# Patient Record
Sex: MALE | Race: WHITE | NOT HISPANIC OR LATINO | URBAN - METROPOLITAN AREA
[De-identification: names, ages, dates, MRNs, and addresses within clinical notes are randomized per-mention and may not be internally consistent; named-entity substitution may affect disease eponyms.]

---

## 2022-10-18 ENCOUNTER — OFFICE VISIT (OUTPATIENT)
Dept: URGENT CARE | Facility: CLINIC | Age: 37
End: 2022-10-18
Payer: COMMERCIAL

## 2022-10-18 VITALS
DIASTOLIC BLOOD PRESSURE: 80 MMHG | HEIGHT: 72 IN | BODY MASS INDEX: 34.29 KG/M2 | RESPIRATION RATE: 18 BRPM | WEIGHT: 253.2 LBS | OXYGEN SATURATION: 99 % | SYSTOLIC BLOOD PRESSURE: 134 MMHG | HEART RATE: 111 BPM | TEMPERATURE: 98.2 F

## 2022-10-18 DIAGNOSIS — R52 BODY ACHES: ICD-10-CM

## 2022-10-18 DIAGNOSIS — R20.2 NUMBNESS AND TINGLING IN BOTH HANDS: ICD-10-CM

## 2022-10-18 DIAGNOSIS — R20.0 NUMBNESS AND TINGLING IN BOTH HANDS: ICD-10-CM

## 2022-10-18 LAB
EXTERNAL QUALITY CONTROL: NORMAL
FLUAV+FLUBV AG SPEC QL IA: NEGATIVE
GLUCOSE BLD-MCNC: 93 MG/DL (ref 70–100)
INT CON NEG: NORMAL
INT CON NEG: NORMAL
INT CON POS: NORMAL
INT CON POS: NORMAL
SARS-COV+SARS-COV-2 AG RESP QL IA.RAPID: NEGATIVE

## 2022-10-18 PROCEDURE — 87426 SARSCOV CORONAVIRUS AG IA: CPT | Performed by: PHYSICIAN ASSISTANT

## 2022-10-18 PROCEDURE — 87804 INFLUENZA ASSAY W/OPTIC: CPT | Performed by: PHYSICIAN ASSISTANT

## 2022-10-18 PROCEDURE — 99204 OFFICE O/P NEW MOD 45 MIN: CPT | Performed by: PHYSICIAN ASSISTANT

## 2022-10-18 PROCEDURE — 82962 GLUCOSE BLOOD TEST: CPT | Performed by: PHYSICIAN ASSISTANT

## 2022-10-18 NOTE — PROGRESS NOTES
Subjective:   Cesar Isaac is a 37 y.o. male who presents for Other (Body aches , hands are clammy , bottom of hands are numb, started having symptoms this morning )        Patient presents with concerns of generalized malaise, fatigue, diffuse body aches that began around 11:00 this morning.  Initially he was experiencing clamminess in his hands and feet and a tingling sensation in his hands and feet.  Tingling has persisted and seems to improve when he lays down and worsens when he sits up.  He also feels little bit lightheaded when he moves from laying down to sitting or sitting to standing rapidly.  No syncope or presyncope.  Nothing else makes his symptoms better or worse.  He denies cough, congestion, changes in taste or smell, fevers.  He does endorse chills.  No chest pain, shortness of breath, difficulty breathing, pain with breathing.  His stomach feels a bit upset but he denies abdominal pain, nausea, vomiting.  No known exposure to COVID-19.  Symptoms began while he was at work.  He did not turn on the heater recently in his office-no concern for carbon monoxide poisoning.  No history of diabetes.  He denies chronic medical problems.  States that he has had similar tingling in his extremities with an anxiety attack previously, but anxiety did not precede his current symptoms.  He is admittedly a bit anxious now.  Denies saddle dysesthesias, loss of bowel or bladder control, lower extremity weakness.    Review of Systems   Constitutional:  Positive for chills and malaise/fatigue. Negative for diaphoresis and fever.   HENT:  Negative for congestion and sore throat.    Eyes:  Negative for blurred vision, double vision and photophobia.   Respiratory:  Negative for cough, hemoptysis, sputum production, shortness of breath and wheezing.    Cardiovascular:  Negative for chest pain and palpitations.   Gastrointestinal:  Negative for abdominal pain, blood in stool, constipation, diarrhea, melena, nausea and  vomiting.   Musculoskeletal:  Positive for myalgias (Generalized full body aches.).   Skin:  Negative for itching and rash.   Neurological:  Positive for tingling. Negative for dizziness, speech change, focal weakness, seizures, loss of consciousness, weakness and headaches (No headache prior to the onset of symptoms or currently.).     PMH:  has no past medical history on file.  MEDS: No current outpatient medications on file.  ALLERGIES: Not on File  SURGHX: No past surgical history on file.  SOCHX:  reports that he has never smoked. He has never used smokeless tobacco. He reports current alcohol use of about 2.4 oz per week. He reports that he does not use drugs.  FH: Family history was reviewed, no pertinent findings to report   Objective:   /80 (BP Location: Left arm, Patient Position: Sitting, BP Cuff Size: Large adult)   Pulse (!) 111   Temp 36.8 °C (98.2 °F) (Oral)   Resp 18   Ht 1.829 m (6')   Wt 115 kg (253 lb 3.2 oz)   SpO2 99%   BMI 34.34 kg/m²   Physical Exam  Vitals reviewed.   Constitutional:       General: He is not in acute distress.     Appearance: Normal appearance. He is well-developed. He is not toxic-appearing.   HENT:      Head: Normocephalic and atraumatic.      Right Ear: External ear normal.      Left Ear: External ear normal.      Nose: Nose normal. No congestion or rhinorrhea.   Cardiovascular:      Rate and Rhythm: Regular rhythm.      Comments: Mildly tachycardic.  Regular rhythm with normal S1 and S2.  No murmurs, rubs, gallops.  Radial pulses 2+ and symmetrical bilaterally.  Posterior tibial pulses 2+ and symmetrical bilaterally.  No lower extremity edema.  Pulmonary:      Effort: Pulmonary effort is normal. No respiratory distress.      Breath sounds: Normal breath sounds. No stridor. No decreased breath sounds, wheezing, rhonchi or rales.      Comments: Patient speaks comfortably in full sentences.  Abdominal:      Comments: Normoactive bowel sounds.  Abdomen is soft  and nontender to palpation.  No guarding or rebound tenderness.  No CVA tenderness bilaterally.  No palpable masses or organomegaly.  No bruits.   Musculoskeletal:      Comments: No midline tenderness with palpation of the cervical, thoracic, lumbar spines.  No palpable step-offs or deformities.   Skin:     General: Skin is dry.   Neurological:      Comments: ANO x3.  Cranial nerves II through XII grossly intact.  No nystagmus or ocular palsy.  Upper and lower extremity strength 5 out of 5 bilaterally.  Upper and lower extremity sensation grossly intact and even bilaterally.  Bicep reflexes 2+ and symmetrical bilaterally.  Patellar reflexes 2+ and symmetrical bilaterally.  Negative heel-to-shin testing.  Negative Romberg.  Negative pronator drift.   Psychiatric:         Speech: Speech normal.         Behavior: Behavior normal.         Assessment/Plan:   1. Body aches  - POCT Influenza A/B  - POCT SARS-COV Antigen DOYLE (Symptomatic only)    2. Numbness and tingling in both hands  - POCT Glucose    POC glucose within normal limits.  Testing for influenza and COVID-19 are negative.Cause of symptoms is unclear.  This does not look like Guillain-Barré.  This does not look like cauda equina.  No risk factors for carbon monoxide poisoning.  This does not look like a CVA or central lesion.  This does not look like ACS or emergent pulmonary pathology such as PE.  This does not look like an aortic dissection at this time.  History and symptoms suggestive of viral illness prodrome, but exact cause is unclear.  Other considerations include but not limited to to metabolic disturbance, electrolyte derangement, vitamin deficiency, MS. Patient advised that symptoms could also be secondary to a process that has not yet fully manifested.  Patient does not appear to be in any immediate danger.  Discussed watchful waiting with strict return and ED precautions versus additional work-up at a higher level of care.   Patient would like to  go home and rest and hydrate.  I also recommend that he drink an electrolyte rich beverage.  If symptoms fail to improve or worsen he will go to the emergency room for further evaluation and management.    Differential diagnosis, natural history, supportive care, and indications for immediate follow-up discussed.

## 2022-10-19 ASSESSMENT — ENCOUNTER SYMPTOMS
HEMOPTYSIS: 0
WEAKNESS: 0
FEVER: 0
COUGH: 0
DIARRHEA: 0
DOUBLE VISION: 0
VOMITING: 0
BLOOD IN STOOL: 0
DIAPHORESIS: 0
PALPITATIONS: 0
FOCAL WEAKNESS: 0
MYALGIAS: 1
LOSS OF CONSCIOUSNESS: 0
SHORTNESS OF BREATH: 0
SPEECH CHANGE: 0
CONSTIPATION: 0
SPUTUM PRODUCTION: 0
CHILLS: 1
SORE THROAT: 0
DIZZINESS: 0
PHOTOPHOBIA: 0
SEIZURES: 0
WHEEZING: 0
BLURRED VISION: 0
HEADACHES: 0
ABDOMINAL PAIN: 0
TINGLING: 1
NAUSEA: 0